# Patient Record
Sex: FEMALE | Race: WHITE | NOT HISPANIC OR LATINO | Employment: UNEMPLOYED | URBAN - NONMETROPOLITAN AREA
[De-identification: names, ages, dates, MRNs, and addresses within clinical notes are randomized per-mention and may not be internally consistent; named-entity substitution may affect disease eponyms.]

---
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
## 2019-10-15 ENCOUNTER — HOSPITAL ENCOUNTER (EMERGENCY)
Facility: HOSPITAL | Age: 19
Discharge: HOME | End: 2019-10-15
Attending: EMERGENCY MEDICINE | Admitting: EMERGENCY MEDICINE

## 2019-10-15 VITALS
HEART RATE: 103 BPM | HEIGHT: 67 IN | SYSTOLIC BLOOD PRESSURE: 107 MMHG | TEMPERATURE: 98.2 F | OXYGEN SATURATION: 98 % | BODY MASS INDEX: 23.98 KG/M2 | RESPIRATION RATE: 17 BRPM | DIASTOLIC BLOOD PRESSURE: 67 MMHG | WEIGHT: 152.8 LBS

## 2019-10-15 DIAGNOSIS — L03.317 CELLULITIS OF RIGHT BUTTOCK: ICD-10-CM

## 2019-10-15 DIAGNOSIS — L02.31 ABSCESS OF RIGHT BUTTOCK: Primary | ICD-10-CM

## 2019-10-15 PROCEDURE — 25010000003 LIDOCAINE 1 % SOLUTION: Performed by: PHYSICIAN ASSISTANT

## 2019-10-15 PROCEDURE — 99283 EMERGENCY DEPT VISIT LOW MDM: CPT

## 2019-10-15 RX ORDER — CEPHALEXIN 250 MG/1
500 CAPSULE ORAL ONCE
Status: COMPLETED | OUTPATIENT
Start: 2019-10-15 | End: 2019-10-15

## 2019-10-15 RX ORDER — CEPHALEXIN 500 MG/1
500 CAPSULE ORAL 4 TIMES DAILY
Qty: 28 CAPSULE | Refills: 0 | Status: SHIPPED | OUTPATIENT
Start: 2019-10-15 | End: 2019-10-22

## 2019-10-15 RX ORDER — LIDOCAINE HYDROCHLORIDE 10 MG/ML
10 INJECTION, SOLUTION INFILTRATION; PERINEURAL ONCE
Status: COMPLETED | OUTPATIENT
Start: 2019-10-15 | End: 2019-10-15

## 2019-10-15 RX ADMIN — LIDOCAINE HYDROCHLORIDE 10 ML: 10 INJECTION, SOLUTION INFILTRATION; PERINEURAL at 16:10

## 2019-10-15 RX ADMIN — CEPHALEXIN 500 MG: 250 CAPSULE ORAL at 16:44

## 2019-10-15 NOTE — ED PROVIDER NOTES
Subjective   Patient is a generally healthy 19-year-old female presenting to the ER for evaluation of a boil to her buttocks.  Patient states she first noticed a scab with some swelling to her right upper buttocks on Jonathan 10/13/19.  She states that it seems as if the swelling, erythema and pain have worsened.  She states it feels like a tight pain, worse with any kind of pressure.  There has not been any kind of drainage.  She denies any fever, chills, emesis.  She denies any rectal pain or difficulty with bowel movements.  She states she just ended her last menstrual period this weekend.  No history of MRSA.            Review of Systems   Constitutional: Negative.    HENT: Negative.    Eyes: Negative.    Respiratory: Negative.    Cardiovascular: Negative.    Gastrointestinal: Negative.    Genitourinary: Negative.    Musculoskeletal: Negative.    Skin: Positive for color change and wound.   Allergic/Immunologic: Negative for immunocompromised state.   Neurological: Negative.    Hematological: Does not bruise/bleed easily.   Psychiatric/Behavioral: Negative.        History reviewed. No pertinent past medical history.    No Known Allergies    History reviewed. No pertinent surgical history.    History reviewed. No pertinent family history.    Social History     Socioeconomic History   • Marital status: Single     Spouse name: Not on file   • Number of children: Not on file   • Years of education: Not on file   • Highest education level: Not on file   Tobacco Use   • Smoking status: Current Every Day Smoker     Packs/day: 1.00     Types: Cigarettes   Substance and Sexual Activity   • Alcohol use: No     Frequency: Never   • Drug use: No           Objective   Physical Exam   Nursing note and vitals reviewed.    GEN: No acute distress  Skin: There is an erythematous fluctuant mass on the right upper buttock that is tender to palpation.  Is approximately 4 cm x 4 cm.  Head: Normocephalic, atraumatic  Eyes: Pupils equal  "round reactive to light  ENT: Posterior pharynx normal in appearance, oral mucosa is moist  Chest: Nontender to palpation  Cardiovascular: Regular rate  Lungs: Clear to auscultation bilaterally  Abdomen: Soft, nontender, nondistended, no peritoneal signs  Extremities: No edema, normal appearance  Neuro: GCS 15  Psych: Mood and affect are appropriate    Incision & Drainage  Date/Time: 10/15/2019 4:38 PM  Performed by: Cornelia Cosby PA-C  Authorized by: Nery Julio MD     Consent:     Consent obtained:  Verbal    Consent given by:  Patient    Risks discussed:  Incomplete drainage, pain, infection and bleeding  Location:     Type:  Abscess    Size:  4 cm x 4 cm    Location:  Lower extremity    Lower extremity location:  Buttock    Buttock location:  R buttock  Pre-procedure details:     Skin preparation:  Antiseptic wash  Anesthesia (see MAR for exact dosages):     Anesthesia method:  Local infiltration    Local anesthetic:  Lidocaine 1% w/o epi  Procedure details:     Needle aspiration: no      Incision types:  Stab incision    Incision depth:  Subcutaneous    Scalpel blade:  11    Wound management:  Probed and deloculated, irrigated with saline and extensive cleaning    Drainage:  Bloody and purulent    Drainage amount:  Moderate    Wound treatment:  Wound left open    Packing materials:  1/4 in gauze    Amount 1/4\":  3 cm  Post-procedure details:     Patient tolerance of procedure:  Tolerated well, no immediate complications               ED Course  ED Course as of Oct 15 1648   Tue Oct 15, 2019   1634 Loculated fluid pocket on bedside ultrasound.  [LA]      ED Course User Index  [LA] Cornelia Cosby PA-C                  MDM  Number of Diagnoses or Management Options  Abscess of right buttock:   Cellulitis of right buttock:   Diagnosis management comments: On arrival, patient is mildly tachycardic but afebrile, no acute distress.  She does not appear toxic.  Differential includes cellulitis, " abscess, and other concerns.  Bedside ultrasound revealed a loculated fluid collection.  Bedside I&D was performed with mild purulent drainage and blood.  We will place her on Keflex, first dose given here.  I will advise she follow-up with her primary care provider and gave her referral, also gave surgery referral in case additional I&D is needed.  Patient verbalized understanding and was in agreement with this plan of care.       Amount and/or Complexity of Data Reviewed  Review and summarize past medical records: yes    Risk of Complications, Morbidity, and/or Mortality  Presenting problems: low  Diagnostic procedures: low  Management options: low    Patient Progress  Patient progress: stable      Final diagnoses:   Abscess of right buttock   Cellulitis of right buttock              Cornelia Cosby PA-C  10/15/19 2713

## 2019-10-15 NOTE — DISCHARGE INSTRUCTIONS
You may remove the packing by small amounts over the next 1 to 2 days until it is completely removed.  Try not to soak in water for long periods of time but keep the area clean with soap and water.  Take antibiotic Keflex until medication is complete.  Take ibuprofen and Tylenol as needed for pain-100 mg ibuprofen and or 500 mg Tylenol every 6 hours.  The area may continue to drain but this is okay.  You will need to follow-up with primary care provider next 1 to 2 days to reevaluate symptoms and ensure they are improving; may contact a local provider or [facility] for an appointment.  If your redness, pain, and swelling do not improve, you may need to follow-up with surgery may contact Dr. Sterling to evaluate for further incision and drainage.  Return to the ER for any change, worsening symptoms, or any additional concerns including not limited to worsening pain or swelling, fever greater than 100.4, intractable vomiting

## 2021-11-28 ENCOUNTER — HOSPITAL ENCOUNTER (EMERGENCY)
Facility: HOSPITAL | Age: 21
Discharge: HOME | End: 2021-11-28
Attending: EMERGENCY MEDICINE | Admitting: EMERGENCY MEDICINE

## 2021-11-28 VITALS
SYSTOLIC BLOOD PRESSURE: 142 MMHG | RESPIRATION RATE: 16 BRPM | OXYGEN SATURATION: 98 % | BODY MASS INDEX: 29.86 KG/M2 | HEIGHT: 68 IN | WEIGHT: 197 LBS | DIASTOLIC BLOOD PRESSURE: 91 MMHG | TEMPERATURE: 99.5 F | HEART RATE: 88 BPM

## 2021-11-28 DIAGNOSIS — B34.9 VIRAL ILLNESS: Primary | ICD-10-CM

## 2021-11-28 LAB
B PARAPERT DNA SPEC QL NAA+PROBE: NOT DETECTED
B PERT DNA SPEC QL NAA+PROBE: NOT DETECTED
C PNEUM DNA NPH QL NAA+NON-PROBE: NOT DETECTED
FLUAV SUBTYP SPEC NAA+PROBE: NOT DETECTED
FLUBV RNA ISLT QL NAA+PROBE: NOT DETECTED
HADV DNA SPEC NAA+PROBE: NOT DETECTED
HCOV 229E RNA SPEC QL NAA+PROBE: NOT DETECTED
HCOV HKU1 RNA SPEC QL NAA+PROBE: NOT DETECTED
HCOV NL63 RNA SPEC QL NAA+PROBE: NOT DETECTED
HCOV OC43 RNA SPEC QL NAA+PROBE: NOT DETECTED
HMPV RNA NPH QL NAA+NON-PROBE: NOT DETECTED
HPIV1 RNA ISLT QL NAA+PROBE: NOT DETECTED
HPIV2 RNA SPEC QL NAA+PROBE: NOT DETECTED
HPIV3 RNA NPH QL NAA+PROBE: NOT DETECTED
HPIV4 P GENE NPH QL NAA+PROBE: NOT DETECTED
M PNEUMO IGG SER IA-ACNC: NOT DETECTED
RHINOVIRUS RNA SPEC NAA+PROBE: DETECTED
RSV RNA NPH QL NAA+NON-PROBE: NOT DETECTED
S PYO AG THROAT QL: NEGATIVE
SARS-COV-2 RNA NPH QL NAA+NON-PROBE: NOT DETECTED

## 2021-11-28 PROCEDURE — 0202U NFCT DS 22 TRGT SARS-COV-2: CPT | Performed by: NURSE PRACTITIONER

## 2021-11-28 PROCEDURE — 87081 CULTURE SCREEN ONLY: CPT | Performed by: NURSE PRACTITIONER

## 2021-11-28 PROCEDURE — 99283 EMERGENCY DEPT VISIT LOW MDM: CPT

## 2021-11-28 PROCEDURE — 87880 STREP A ASSAY W/OPTIC: CPT | Performed by: NURSE PRACTITIONER

## 2021-11-30 LAB — BACTERIA SPEC AEROBE CULT: NORMAL

## 2022-12-15 ENCOUNTER — HOSPITAL ENCOUNTER (EMERGENCY)
Facility: HOSPITAL | Age: 22
Discharge: HOME | End: 2022-12-15
Attending: EMERGENCY MEDICINE | Admitting: EMERGENCY MEDICINE

## 2022-12-15 VITALS
OXYGEN SATURATION: 98 % | HEIGHT: 68 IN | RESPIRATION RATE: 18 BRPM | HEART RATE: 86 BPM | WEIGHT: 200 LBS | DIASTOLIC BLOOD PRESSURE: 83 MMHG | SYSTOLIC BLOOD PRESSURE: 109 MMHG | BODY MASS INDEX: 30.31 KG/M2 | TEMPERATURE: 98.3 F

## 2022-12-15 DIAGNOSIS — R05.1 ACUTE COUGH: Primary | ICD-10-CM

## 2022-12-15 LAB
FLUAV RNA RESP QL NAA+PROBE: NOT DETECTED
FLUBV RNA RESP QL NAA+PROBE: NOT DETECTED
SARS-COV-2 RNA RESP QL NAA+PROBE: NOT DETECTED

## 2022-12-15 PROCEDURE — 99283 EMERGENCY DEPT VISIT LOW MDM: CPT

## 2022-12-15 PROCEDURE — 87636 SARSCOV2 & INF A&B AMP PRB: CPT

## 2022-12-15 PROCEDURE — C9803 HOPD COVID-19 SPEC COLLECT: HCPCS

## 2022-12-15 RX ORDER — BROMPHENIRAMINE MALEATE, PSEUDOEPHEDRINE HYDROCHLORIDE, AND DEXTROMETHORPHAN HYDROBROMIDE 2; 30; 10 MG/5ML; MG/5ML; MG/5ML
5 SYRUP ORAL 4 TIMES DAILY PRN
Qty: 118 ML | Refills: 0 | Status: SHIPPED | OUTPATIENT
Start: 2022-12-15

## 2022-12-15 RX ORDER — DEXAMETHASONE 0.5 MG/1
TABLET ORAL
Qty: 21 TABLET | Refills: 0 | Status: SHIPPED | OUTPATIENT
Start: 2022-12-15

## 2022-12-15 NOTE — DISCHARGE INSTRUCTIONS
Patient is here swab today was negative for influenza A and B as well as COVID.  He most likely had a viral illness and are having lingering symptoms.  Take the prescribed medications to help with your cough.  Recommend to follow-up with your primary provider for reevaluation if you are not having any improvement, or can return to the ER for any new or worsening symptoms.

## 2022-12-15 NOTE — ED PROVIDER NOTES
Subjective   History of Present Illness  Patient is a 22-year-old female here today for cough and congestion.  Her symptoms started approximately 2 weeks ago.  Last Monday the patient and her family members were tested for flu.  Her son was positive for flu while the patient and her  were not.  Patient notes a persistent cough with some phlegm production, mainly congestion with a little runny nose.  Also having nausea, headache, pain in her chest more so with lying down, and feeling short of breath after coughing.      Review of Systems   Constitutional: Negative for chills and fever.   HENT: Positive for congestion, postnasal drip and sore throat. Negative for ear pain, rhinorrhea, sinus pressure, sinus pain and sneezing.    Respiratory: Positive for cough. Negative for wheezing.    Gastrointestinal: Positive for nausea. Negative for abdominal pain, diarrhea and vomiting.   Neurological: Positive for headaches. Negative for dizziness and light-headedness.   All other systems reviewed and are negative.      No past medical history on file.    Allergies   Allergen Reactions   • Penicillins Unknown - Low Severity       Past Surgical History:   Procedure Laterality Date   • ADENOIDECTOMY     • TONSILLECTOMY         No family history on file.    Social History     Socioeconomic History   • Marital status: Single   Tobacco Use   • Smoking status: Every Day     Packs/day: 1.00     Types: Cigarettes     Passive exposure: Current   • Smokeless tobacco: Never   Vaping Use   • Vaping Use: Former   Substance and Sexual Activity   • Alcohol use: No   • Drug use: No   • Sexual activity: Defer           Objective   Physical Exam  Vitals and nursing note reviewed.   Constitutional:       Appearance: Normal appearance.   HENT:      Head: Normocephalic and atraumatic.      Right Ear: Tympanic membrane, ear canal and external ear normal.      Left Ear: Tympanic membrane, ear canal and external ear normal.      Nose: Nose  normal.      Mouth/Throat:      Mouth: Mucous membranes are moist.      Pharynx: Oropharynx is clear. Posterior oropharyngeal erythema present.   Eyes:      Extraocular Movements: Extraocular movements intact.      Conjunctiva/sclera: Conjunctivae normal.      Pupils: Pupils are equal, round, and reactive to light.   Neck:      Vascular: No carotid bruit.   Cardiovascular:      Rate and Rhythm: Normal rate and regular rhythm.      Pulses: Normal pulses.      Heart sounds: Normal heart sounds.   Pulmonary:      Effort: Pulmonary effort is normal.      Breath sounds: Normal breath sounds.   Abdominal:      General: Abdomen is flat. Bowel sounds are normal. There is no distension.      Palpations: Abdomen is soft.      Tenderness: There is no abdominal tenderness.   Musculoskeletal:      Cervical back: Neck supple. No tenderness.      Right lower leg: No edema.      Left lower leg: No edema.   Lymphadenopathy:      Cervical: No cervical adenopathy.   Skin:     General: Skin is warm and dry.      Capillary Refill: Capillary refill takes less than 2 seconds.   Neurological:      General: No focal deficit present.      Mental Status: She is alert and oriented to person, place, and time.   Psychiatric:         Mood and Affect: Mood normal.         Behavior: Behavior normal.         Procedures           ED Course  ED Course as of 12/15/22 1428   Thu Dec 15, 2022   1258 COVID19: Not Detected [TA]   1258 Influenza A PCR: Not Detected [TA]   1258 Influenza B PCR: Not Detected [TA]      ED Course User Index  [TA] Roman Ryan, APRJOSH                                           [facility]  Number of Diagnoses or Management Options  Acute cough  Diagnosis management comments: Patient is a 22-year-old female here today with cough and congestion.  She was negative for COVID, and influenza A and B.  She does not appear to be in acute distress and no adventitious lung sounds are heard.  I suspect that she either has a virus that we have not  tested for today or she is having lingering symptoms from a previous viral infection.  Since the symptoms have been present for approximately 2 weeks, I provided the patient with steroids to take and Bromfed.  Advised her that if she is not having any improvement with these medications she needs a follow-up by her primary provider for reevaluation.       Amount and/or Complexity of Data Reviewed  Clinical lab tests: reviewed and ordered  Discussion of test results with the performing providers: yes  Discuss the patient with other providers: yes    Patient Progress  Patient progress: stable      Final diagnoses:   Acute cough       ED Disposition  ED Disposition     ED Disposition   Discharge    Condition   Stable    Comment   --             Shell Ly, APRN  [address] [place] [address] [phone]    Schedule an appointment as soon as possible for a visit   As needed         Medication List      New Prescriptions    brompheniramine-pseudoephedrine-DM 30-2-10 MG/5ML syrup  Take 5 mL by mouth 4 (Four) Times a Day As Needed for Allergies.     dexamethasone 0.5 MG tablet  Commonly known as: DECADRON  6 po x1d; then 5 po x 1d; then 4 po x 1d; then 3 po x 1d; then 2 po x 1d; then 1 po x1d; then STOP           Where to Get Your Medications      These medications were sent to [id number] - [place], [place] - [address] - [phone]  - [phone] FX  [address] [place] [address] [place] [address]    Phone: [phone]   · brompheniramine-pseudoephedrine-DM 30-2-10 MG/5ML syrup  · dexamethasone 0.5 MG tablet          Roman Ryan, JOHN  12/15/22 1418

## 2024-04-03 ENCOUNTER — HOSPITAL ENCOUNTER (EMERGENCY)
Facility: HOSPITAL | Age: 24
Discharge: HOME | End: 2024-04-03
Attending: STUDENT IN AN ORGANIZED HEALTH CARE EDUCATION/TRAINING PROGRAM | Admitting: STUDENT IN AN ORGANIZED HEALTH CARE EDUCATION/TRAINING PROGRAM
Payer: COMMERCIAL

## 2024-04-03 ENCOUNTER — APPOINTMENT (OUTPATIENT)
Dept: CT IMAGING | Facility: HOSPITAL | Age: 24
End: 2024-04-03
Payer: COMMERCIAL

## 2024-04-03 VITALS
DIASTOLIC BLOOD PRESSURE: 95 MMHG | BODY MASS INDEX: 31.83 KG/M2 | HEIGHT: 68 IN | SYSTOLIC BLOOD PRESSURE: 128 MMHG | OXYGEN SATURATION: 98 % | RESPIRATION RATE: 18 BRPM | HEART RATE: 90 BPM | WEIGHT: 210 LBS | TEMPERATURE: 98.1 F

## 2024-04-03 DIAGNOSIS — R42 DIZZINESS: Primary | ICD-10-CM

## 2024-04-03 LAB
ALBUMIN SERPL-MCNC: 4.7 G/DL (ref 3.5–5.2)
ALBUMIN/GLOB SERPL: 1.6 G/DL
ALP SERPL-CCNC: 111 U/L (ref 39–117)
ALT SERPL W P-5'-P-CCNC: 40 U/L (ref 1–33)
ANION GAP SERPL CALCULATED.3IONS-SCNC: 13.5 MMOL/L (ref 5–15)
AST SERPL-CCNC: 29 U/L (ref 1–32)
BASOPHILS # BLD AUTO: 0.06 10*3/MM3 (ref 0–0.2)
BASOPHILS NFR BLD AUTO: 0.7 % (ref 0–1.5)
BILIRUB SERPL-MCNC: 0.7 MG/DL (ref 0–1.2)
BUN SERPL-MCNC: 12 MG/DL (ref 6–20)
BUN/CREAT SERPL: 13 (ref 7–25)
CALCIUM SPEC-SCNC: 9.5 MG/DL (ref 8.6–10.5)
CHLORIDE SERPL-SCNC: 99 MMOL/L (ref 98–107)
CO2 SERPL-SCNC: 27.5 MMOL/L (ref 22–29)
CREAT SERPL-MCNC: 0.92 MG/DL (ref 0.57–1)
DEPRECATED RDW RBC AUTO: 43 FL (ref 37–54)
EGFRCR SERPLBLD CKD-EPI 2021: 89.4 ML/MIN/1.73
EOSINOPHIL # BLD AUTO: 0.12 10*3/MM3 (ref 0–0.4)
EOSINOPHIL NFR BLD AUTO: 1.5 % (ref 0.3–6.2)
ERYTHROCYTE [DISTWIDTH] IN BLOOD BY AUTOMATED COUNT: 12.9 % (ref 12.3–15.4)
GLOBULIN UR ELPH-MCNC: 3 GM/DL
GLUCOSE SERPL-MCNC: 93 MG/DL (ref 65–99)
HCG SERPL QL: NEGATIVE
HCT VFR BLD AUTO: 44.1 % (ref 34–46.6)
HGB BLD-MCNC: 15 G/DL (ref 12–15.9)
HOLD SPECIMEN: NORMAL
HOLD SPECIMEN: NORMAL
IMM GRANULOCYTES # BLD AUTO: 0.02 10*3/MM3 (ref 0–0.05)
IMM GRANULOCYTES NFR BLD AUTO: 0.2 % (ref 0–0.5)
LYMPHOCYTES # BLD AUTO: 1.74 10*3/MM3 (ref 0.7–3.1)
LYMPHOCYTES NFR BLD AUTO: 21.1 % (ref 19.6–45.3)
MCH RBC QN AUTO: 31 PG (ref 26.6–33)
MCHC RBC AUTO-ENTMCNC: 34 G/DL (ref 31.5–35.7)
MCV RBC AUTO: 91.1 FL (ref 79–97)
MONOCYTES # BLD AUTO: 0.32 10*3/MM3 (ref 0.1–0.9)
MONOCYTES NFR BLD AUTO: 3.9 % (ref 5–12)
NEUTROPHILS NFR BLD AUTO: 5.99 10*3/MM3 (ref 1.7–7)
NEUTROPHILS NFR BLD AUTO: 72.6 % (ref 42.7–76)
NRBC BLD AUTO-RTO: 0 /100 WBC (ref 0–0.2)
PLATELET # BLD AUTO: 271 10*3/MM3 (ref 140–450)
PMV BLD AUTO: 10.5 FL (ref 6–12)
POTASSIUM SERPL-SCNC: 4 MMOL/L (ref 3.5–5.2)
PROT SERPL-MCNC: 7.7 G/DL (ref 6–8.5)
RBC # BLD AUTO: 4.84 10*6/MM3 (ref 3.77–5.28)
SODIUM SERPL-SCNC: 140 MMOL/L (ref 136–145)
WBC NRBC COR # BLD AUTO: 8.25 10*3/MM3 (ref 3.4–10.8)
WHOLE BLOOD HOLD COAG: NORMAL
WHOLE BLOOD HOLD SPECIMEN: NORMAL

## 2024-04-03 PROCEDURE — 96374 THER/PROPH/DIAG INJ IV PUSH: CPT

## 2024-04-03 PROCEDURE — 70450 CT HEAD/BRAIN W/O DYE: CPT

## 2024-04-03 PROCEDURE — 84703 CHORIONIC GONADOTROPIN ASSAY: CPT | Performed by: STUDENT IN AN ORGANIZED HEALTH CARE EDUCATION/TRAINING PROGRAM

## 2024-04-03 PROCEDURE — 70496 CT ANGIOGRAPHY HEAD: CPT

## 2024-04-03 PROCEDURE — 25810000003 SODIUM CHLORIDE 0.9 % SOLUTION: Performed by: STUDENT IN AN ORGANIZED HEALTH CARE EDUCATION/TRAINING PROGRAM

## 2024-04-03 PROCEDURE — 93005 ELECTROCARDIOGRAM TRACING: CPT

## 2024-04-03 PROCEDURE — 25510000001 IOPAMIDOL 61 % SOLUTION: Performed by: STUDENT IN AN ORGANIZED HEALTH CARE EDUCATION/TRAINING PROGRAM

## 2024-04-03 PROCEDURE — 99285 EMERGENCY DEPT VISIT HI MDM: CPT

## 2024-04-03 PROCEDURE — 96375 TX/PRO/DX INJ NEW DRUG ADDON: CPT

## 2024-04-03 PROCEDURE — 25010000002 ONDANSETRON PER 1 MG: Performed by: STUDENT IN AN ORGANIZED HEALTH CARE EDUCATION/TRAINING PROGRAM

## 2024-04-03 PROCEDURE — 80053 COMPREHEN METABOLIC PANEL: CPT

## 2024-04-03 PROCEDURE — 70498 CT ANGIOGRAPHY NECK: CPT

## 2024-04-03 PROCEDURE — 85025 COMPLETE CBC W/AUTO DIFF WBC: CPT

## 2024-04-03 PROCEDURE — 25010000002 DIAZEPAM PER 5 MG: Performed by: STUDENT IN AN ORGANIZED HEALTH CARE EDUCATION/TRAINING PROGRAM

## 2024-04-03 RX ORDER — MECLIZINE HYDROCHLORIDE 25 MG/1
25 TABLET ORAL 3 TIMES DAILY PRN
Qty: 10 TABLET | Refills: 0 | Status: SHIPPED | OUTPATIENT
Start: 2024-04-03

## 2024-04-03 RX ORDER — DIAZEPAM 5 MG/ML
5 INJECTION, SOLUTION INTRAMUSCULAR; INTRAVENOUS ONCE
Status: COMPLETED | OUTPATIENT
Start: 2024-04-03 | End: 2024-04-03

## 2024-04-03 RX ORDER — ONDANSETRON 2 MG/ML
4 INJECTION INTRAMUSCULAR; INTRAVENOUS ONCE
Status: COMPLETED | OUTPATIENT
Start: 2024-04-03 | End: 2024-04-03

## 2024-04-03 RX ORDER — SODIUM CHLORIDE 0.9 % (FLUSH) 0.9 %
10 SYRINGE (ML) INJECTION AS NEEDED
Status: DISCONTINUED | OUTPATIENT
Start: 2024-04-03 | End: 2024-04-03 | Stop reason: HOSPADM

## 2024-04-03 RX ADMIN — ONDANSETRON 4 MG: 2 INJECTION INTRAMUSCULAR; INTRAVENOUS at 15:37

## 2024-04-03 RX ADMIN — DIAZEPAM 5 MG: 10 INJECTION, SOLUTION INTRAMUSCULAR; INTRAVENOUS at 15:41

## 2024-04-03 RX ADMIN — SODIUM CHLORIDE 1000 ML: 9 INJECTION, SOLUTION INTRAVENOUS at 14:01

## 2024-04-03 RX ADMIN — IOPAMIDOL 100 ML: 612 INJECTION, SOLUTION INTRAVENOUS at 14:53

## 2024-04-03 NOTE — Clinical Note
[facility] EMERGENCY DEPARTMENT  [address] [address]  Phone: [phone]    Teresa Cárdenas was seen and treated in our emergency department on 4/3/2024.  She may return to work on 04/08/2024.         Thank you for choosing [facility].    Noe Childers, DO

## 2024-04-03 NOTE — ED PROVIDER NOTES
"EMERGENCY DEPARTMENT ENCOUNTER    Pt Name: Teresa Cárdenas  MRN: [id number]  Pt :   2000  Room Number:    Date of encounter:  4/3/2024  PCP: Shell Ly APRN  ED Provider: Noe Childers DO      HPI:  Chief Complaint: Dizziness    Context: Teresa Cárdenas is a 23 y.o. female who presents to the ED with chief complaint of dizziness.  Last normal was 10 PM last night whenever patient went to sleep.  States that she woke up around 6 AM with dizziness.  Dizziness is described as \"feeling drunk\".  Duration is constant.  Worsened by rotation of the head.  Associated nausea and vomiting x 2.  No fever, chills, headache, blurred vision, neck pain, chest pain, back pain, abdominal pain, diarrhea or bloody stools, problems with urination or bowel movements, numbness or weakness in the extremities.    No stated past medical history.  Previously healthy.  No current medication use. No previous surgeries.  Smokes daily.  Denies alcohol or recreational drug use.  Works as a professional .    PAST MEDICAL HISTORY  History reviewed. No pertinent past medical history.  Current Outpatient Medications   Medication Instructions    brompheniramine-pseudoephedrine-DM 30-2-10 MG/5ML syrup 5 mL, Oral, 4 Times Daily PRN    dexamethasone (DECADRON) 0.5 MG tablet 6 po x1d; then 5 po x 1d; then 4 po x 1d; then 3 po x 1d; then 2 po x 1d; then 1 po x1d; then STOP    meclizine (ANTIVERT) 25 mg, Oral, 3 Times Daily PRN    ondansetron ODT (ZOFRAN-ODT) 4 mg, Translingual, Every 8 Hours PRN        PAST SURGICAL HISTORY  Past Surgical History:   Procedure Laterality Date    ADENOIDECTOMY      TONSILLECTOMY         FAMILY HISTORY  History reviewed. No pertinent family history.    SOCIAL HISTORY  Social History     Socioeconomic History    Marital status: Single   Tobacco Use    Smoking status: Every Day     Current packs/day: 1.00     Types: Cigarettes     Passive exposure: Current    Smokeless tobacco: Never "   Vaping Use    Vaping status: Former   Substance and Sexual Activity    Alcohol use: Yes     Comment: Weekly    Drug use: No    Sexual activity: Defer       ALLERGIES  Penicillins    REVIEW OF SYSTEMS  All systems reviewed and negative except for those discussed in HPI.     PHYSICAL EXAM  ED Triage Vitals [04/03/24 1329]   Temp Heart Rate Resp BP SpO2   98.1 °F (36.7 °C) 101 18 133/91 98 %      Temp src Heart Rate Source Patient Position BP Location FiO2 (%)   Oral Monitor Sitting Left arm --     I have reviewed the triage vital signs and nursing notes.    General: Alert.  Nontoxic appearance.  No acute distress.  Head: Normocephalic.  Atraumatic.  Eyes: Pupils 2 mm.  PERRLA.  EOMI.  No scleral icterus.  Cardiovascular: Regular rate and rhythm.  No murmurs.  No rubs.  2+ distal pulses bilaterally.  Respiratory: Equal breath sounds bilaterally.  No rales.  No rhonchi.  No wheezing.  GI: Abdomen is soft.  Nondistended.  Nontender to palpation.  No rebound.  No guarding.  No CVA tenderness.  MSK: No muscle atrophy.  Neurologic: Oriented x 3.  Speech intact without dysarthria or aphasia. Cranial nerves II-XII intact.  Strength 5/5 bilateral upper and lower extremities.  Sensation to touch grossly intact bilaterally.  No focal deficits.  No pronator drift.  Normal finger-nose test.    Skin: No erythema. No edema.  Psych: Normal mood and affect.     LAB RESULTS  Recent Results (from the past 24 hour(s))   Comprehensive Metabolic Panel    Collection Time: 04/03/24  1:39 PM    Specimen: Blood   Result Value Ref Range    Glucose 93 65 - 99 mg/dL    BUN 12 6 - 20 mg/dL    Creatinine 0.92 0.57 - 1.00 mg/dL    Sodium 140 136 - 145 mmol/L    Potassium 4.0 3.5 - 5.2 mmol/L    Chloride 99 98 - 107 mmol/L    CO2 27.5 22.0 - 29.0 mmol/L    Calcium 9.5 8.6 - 10.5 mg/dL    Total Protein 7.7 6.0 - 8.5 g/dL    Albumin 4.7 3.5 - 5.2 g/dL    ALT (SGPT) 40 (H) 1 - 33 U/L    AST (SGOT) 29 1 - 32 U/L    Alkaline Phosphatase 111 39 - 117 U/L     Total Bilirubin 0.7 0.0 - 1.2 mg/dL    Globulin 3.0 gm/dL    A/G Ratio 1.6 g/dL    BUN/Creatinine Ratio 13.0 7.0 - 25.0    Anion Gap 13.5 5.0 - 15.0 mmol/L    eGFR 89.4 >60.0 mL/min/1.73   Green Top (Gel)    Collection Time: 04/03/24  1:39 PM   Result Value Ref Range    Extra Tube Hold for add-ons.    Lavender Top    Collection Time: 04/03/24  1:39 PM   Result Value Ref Range    Extra Tube hold for add-on    Gold Top - SST    Collection Time: 04/03/24  1:39 PM   Result Value Ref Range    Extra Tube Hold for add-ons.    Light Blue Top    Collection Time: 04/03/24  1:39 PM   Result Value Ref Range    Extra Tube Hold for add-ons.    CBC Auto Differential    Collection Time: 04/03/24  1:39 PM    Specimen: Blood   Result Value Ref Range    WBC 8.25 3.40 - 10.80 10*3/mm3    RBC 4.84 3.77 - 5.28 10*6/mm3    Hemoglobin 15.0 12.0 - 15.9 g/dL    Hematocrit 44.1 34.0 - 46.6 %    MCV 91.1 79.0 - 97.0 fL    MCH 31.0 26.6 - 33.0 pg    MCHC 34.0 31.5 - 35.7 g/dL    RDW 12.9 12.3 - 15.4 %    RDW-SD 43.0 37.0 - 54.0 fl    MPV 10.5 6.0 - 12.0 fL    Platelets 271 140 - 450 10*3/mm3    Neutrophil % 72.6 42.7 - 76.0 %    Lymphocyte % 21.1 19.6 - 45.3 %    Monocyte % 3.9 (L) 5.0 - 12.0 %    Eosinophil % 1.5 0.3 - 6.2 %    Basophil % 0.7 0.0 - 1.5 %    Immature Grans % 0.2 0.0 - 0.5 %    Neutrophils, Absolute 5.99 1.70 - 7.00 10*3/mm3    Lymphocytes, Absolute 1.74 0.70 - 3.10 10*3/mm3    Monocytes, Absolute 0.32 0.10 - 0.90 10*3/mm3    Eosinophils, Absolute 0.12 0.00 - 0.40 10*3/mm3    Basophils, Absolute 0.06 0.00 - 0.20 10*3/mm3    Immature Grans, Absolute 0.02 0.00 - 0.05 10*3/mm3    nRBC 0.0 0.0 - 0.2 /100 WBC   hCG, Serum, Qualitative    Collection Time: 04/03/24  1:39 PM    Specimen: Blood   Result Value Ref Range    HCG Qualitative Negative Negative       RADIOLOGY  CT Angiogram Head, CT Angiogram Neck    Result Date: 4/3/2024  PROCEDURE: CT ANGIOGRAM HEAD-, CT ANGIOGRAM NECK-  HISTORY: Dizziness  TECHNIQUE: Thin section axial  CT with IV contrast supplemented with multiplanar 3 D reconstructions of the head and neck. This study was performed with techniques to keep radiation doses as low as reasonably achievable, (ALARA). Individualized dose reduction techniques using automated exposure control or adjustment of mA and/or kV according to the patient size were employed.  FINDINGS:  Head CT: The ventricles are proper size. There is no evidence of hemorrhage. No masses are identified.  No extra-axial fluid is seen. The sinuses are unremarkable.  CTA:  Aortic arch:  Arch shows no significant narrowing. Great vessel origins are widely patent.  Right carotid:  No significant stenosis is seen of the cervical common or internal carotid artery.  Left carotid:  No significant stenosis is seen of the cervical common or internal carotid artery.  Vertebrals: The vertebrals are patent. No significant stenosis is present. System is codominant.  The cranial circulation is unremarkable. There is no significant stenosis, aneurysm, or occlusion.      No acute process.    CTDI: 36.61 mGy DLP:636.21 mGy.cm   This report was signed and finalized on 4/3/2024 3:18 PM by Cornelia Fregoso MD.      CT Head Without Contrast    Result Date: 4/3/2024  PROCEDURE: CT HEAD WO CONTRAST-  HISTORY: Dizziness  COMPARISON: None.  TECHNIQUE: Multiple axial CT images were performed from the foramen magnum to the vertex. Individualized dose reduction techniques using automated exposure control or adjustment of mA and/or kV according to the patient size were employed.  FINDINGS: The brain parenchyma is unremarkable.  The ventricles are proper size. There is no evidence of hemorrhage. No masses are identified. No abnormal extra-axial fluid is seen. The paranasal sinuses and mastoid air cells are unremarkable.      No acute intracranial process.     This report was signed and finalized on 4/3/2024 3:08 PM by Cornelia Fregoso MD.       PROCEDURES  Procedures  Interval: baseline  1a. Level of  Consciousness: 0-->Alert, keenly responsive  1b. LOC Questions: 0-->Answers both questions correctly  1c. LOC Commands: 0-->Performs both tasks correctly  2. Best Gaze: 0-->Normal  3. Visual: 0-->No visual loss  4. Facial Palsy: 0-->Normal symmetrical movements  5a. Motor Arm, Left: 0-->No drift, limb holds 90 (or 45) degrees for full 10 secs  5b. Motor Arm, Right: 0-->No drift, limb holds 90 (or 45) degrees for full 10 secs  6a. Motor Leg, Left: 0-->No drift, leg holds 30 degree position for full 5 secs  6b. Motor Leg, Right: 0-->No drift, leg holds 30 degree position for full 5 secs  7. Limb Ataxia: 0-->Absent  8. Sensory: 0-->Normal, no sensory loss  9. Best Language: 0-->No aphasia, normal  10. Dysarthria: 0-->Normal  11. Extinction and Inattention (formerly Neglect): 0-->No abnormality    Total (NIH Stroke Scale): 0    MEDICATIONS GIVEN IN ER  Medications   sodium chloride 0.9 % flush 10 mL (has no administration in time range)   sodium chloride 0.9 % bolus 1,000 mL (1,000 mL Intravenous New Bag 4/3/24 1401)   diazePAM (VALIUM) injection 5 mg (5 mg Intravenous Given 4/3/24 1541)   ondansetron (ZOFRAN) injection 4 mg (4 mg Intravenous Given 4/3/24 1537)   iopamidol (ISOVUE-300) 61 % injection 100 mL (100 mL Intravenous Given 4/3/24 1453)       MEDICAL DECISION MAKING  23 y.o. female who presents with chief complaint of dizziness. Vital signs within normal limits. Based on clinical presentation and physical exam, differential diagnosis includes, but is not limited to, peripheral versus central vertigo, intracranial structural versus vascular abnormality, less likely posterior CVA, metabolic derangement.  NIH stroke scale 0 listed above.  No clinical indication for ED stroke alert as patient presents outside of window for thrombolytic therapy.  Patient is not an endovascular candidate.    I have discussed the indication, risk, and alternatives of the following high risk medications: IV Valium    At least 3  different tests have been ordered on this patient.    Please see ED course below for my interpretation of the ED workup.  ED Course as of 04/03/24 1601   Wed Apr 03, 2024   1401 ECG 12 Lead ED Triage Standing Order; Weak / Dizzy / AMS  EKG per my interpretation sinus tachycardia, 104, normal axis, no ST segment elevation or depression, normal QRS and QTc intervals. [JS]   1526 I reviewed the labs listed above. Clinically unremarkable. [JS]   1527 I have independently reviewed and interpreted the imaging listed above.  My interpretations are listed below:    -CT head negative for intracranial hemorrhage or mass effect.    -CTA head and neck negative for intracranial aneurysm or large vessel occlusion. [JS]      ED Course User Index  [JS] Noe Childers DO      On re-evaluation, patient resting comfortably.  States symptoms have improved following therapy. Vital signs remained stable on room air.  Patient was ambulatory in the ED with steady gait.  Able to tolerate oral intake appropriately.    I discussed the findings of the ED workup with the patient at bedside. Shared medical decision discussed with the patient in regards to disposition.  I did offer to admit the patient to have MRI brain to truly rule out posterior CVA. Patient was informed of the indication, benefits, alternative diagnostic options, and risks including, but not limited to missed and/or delayed diagnosis, therefore leading to failure to treat. After discussion patient politely declined admission.  States she feels better and request discharge.  Will follow-up in the outpatient setting. The patient is clinically not intoxicated, free from distracting pain, appears to have intact insight, judgment and reason and in my medical opinion has the capacity to make medical decisions. I recommended outpatient follow-up with PCP/ENT/neurology.  Patient was deemed medically stable for discharge with close outpatient follow-up and strict ED return  precautions. Patient agreeable with plan and disposition.    Home medications were reviewed.  Prescriptions for discharge: Meclizine    Chronic conditions affecting care: None    Social determinants of health impacting treatment or disposition: None    REPEAT VITAL SIGNS  AS OF 16:01 EDT VITALS:  BP - 128/95  HR - 90  TEMP - 98.1 °F (36.7 °C) (Oral)  O2 SATS - 98%    DIAGNOSIS  Final diagnoses:   Dizziness       DISPOSITION  ED Disposition       ED Disposition   Discharge    Condition   Stable    Comment   --                     Please note that portions of this document were completed with voice recognition software.        Noe Childers DO  04/04/24 0868

## 2024-04-03 NOTE — Clinical Note
[facility] EMERGENCY DEPARTMENT  [address] [address]  Phone: [phone]    Teresa Cárdenas was seen and treated in our emergency department on 4/3/2024.  She may return to work on 04/08/2024.         Thank you for choosing [facility].    Noe Childers, DO